# Patient Record
Sex: FEMALE | Race: WHITE | NOT HISPANIC OR LATINO | Employment: OTHER | URBAN - METROPOLITAN AREA
[De-identification: names, ages, dates, MRNs, and addresses within clinical notes are randomized per-mention and may not be internally consistent; named-entity substitution may affect disease eponyms.]

---

## 2022-09-06 ENCOUNTER — TELEPHONE (OUTPATIENT)
Dept: DERMATOLOGY | Facility: CLINIC | Age: 67
End: 2022-09-06

## 2022-09-06 NOTE — TELEPHONE ENCOUNTER
Kell has a biopsy confirmed BCC on the right medial canthus.  Biopsy completed on 8/10/22 and records are in Care Everywhere.    In person consult scheduled for 9/7 with surgery scheduled for 9/12.  Patient aware that if oculoplastics needs to complete closure that her surgery date will change.    Will complete Mohs previsit at time of consult.    Mallorie Whittaker RN

## 2022-09-07 ENCOUNTER — OFFICE VISIT (OUTPATIENT)
Dept: DERMATOLOGY | Facility: CLINIC | Age: 67
End: 2022-09-07
Payer: COMMERCIAL

## 2022-09-07 DIAGNOSIS — F41.9 ANXIETY DUE TO INVASIVE PROCEDURE: Primary | ICD-10-CM

## 2022-09-07 PROCEDURE — 99213 OFFICE O/P EST LOW 20 MIN: CPT | Mod: 57 | Performed by: DERMATOLOGY

## 2022-09-07 RX ORDER — FEXOFENADINE HCL 180 MG/1
180 TABLET ORAL DAILY
COMMUNITY

## 2022-09-07 RX ORDER — ALPRAZOLAM 0.5 MG
TABLET ORAL
Qty: 3 TABLET | Refills: 0 | Status: SHIPPED | OUTPATIENT
Start: 2022-09-07

## 2022-09-07 NOTE — NURSING NOTE
[facility] [place] Pre-Surgery Screening Note     Pre-screening Information:  Hx of Skin Cancer: No  Hx of Mohs Surgery: No  Transplant: No  Immunocompromised: No  Current Anticoagulant(s): Aspirin  Bleeding Disorder(s): No  Stent: No  Pacemaker: No  Defibrillator: No  Brain/Nerve Stimulator: No  Endocarditis/Rheumatic Fever Hx: No  Vascular graft: No  Prophylactic Antibiotic Needed: No  Congenital heart defect: No  Prosthetic Heart Valve: No  Lesion on Leg/Groin: No  Prosthetic Joint : No  Diabetic: Yes (type 2)  HIV/AIDS: No  Hepatitis: No  Pregnant: No  Prior Problem with Local Anesthesia: No  Patient wears CPAP mask: No  Current Tobacco Use: No  Current Alcohol Use: Yes  Extended Care Facility: No  Occupation: retired   needed?: Yes  Do you have mobility issues?: No  Do you use any assistive devices/DME?: No  Do you have any issues with lying for long periods of time?: No      Medications/Allergies  Medications and allergies review with patient: Yes     Current Outpatient Medications   Medication Sig Dispense Refill     ALPRAZolam (XANAX) 0.5 MG tablet Take one tablet by mouth prior to skin surgery, bring the remaining tablets to the clinic for dispensing according to clinic protocol. 3 tablet 0     fexofenadine (ALLEGRA) 180 MG tablet Take 180 mg by mouth daily       FLUTICASONE PROPIONATE 50 MCG/ACT NA SUSP PRN       HYDROCHLOROTHIAZIDE 12.5 MG PO CAPS 1 CAPSULE DAILY       LEVOTHYROXINE SODIUM 112 mcg daily       POTASSIUM BICARBONATE PO        Allergies   Allergen Reactions     Codeine        Pertinent Labs:  Last INR: No results found for: INR    Other Reminders:    Reminded patient to take any order prophylactic antibiotics 1 hour prior to appointment: N/A     Please be aware that this can be an all day procedure. Please bring your daily medications and food/cash. Encourage patient to eat prior to procedure(s). After care instructions were reviewed with  patient.    Mallorie Whittaker RN

## 2022-09-07 NOTE — LETTER
9/7/2022         RE: Kell Mcconnell  [address] [address]        Dear Colleague,    Thank you for referring your patient, Kell Mcconnell, to the [facility]. Please see a copy of my visit note below.    [facility] Dermatology Note  Encounter Date: Sep 7, 2022  Office Visit     Derm Problem List:  1. BCC, R medial canthus, s/p Mohs scheduled 9/12/22  ____________________________________________________________  Assessment & Plan:     # BCC, R medial canthus.  - The nature, risks, benefits, and alternatives to Mohs surgery were discussed. The patient would like to proceed with Mohs surgery.  - Repair options were discussed (Oculoplastic repair versus Repair in the dermatology clinic), ultimately she chose to have the repair done in the dermatology clinic.   - likely repair rhombic transposition flap vs FTSG  - anxiolytic prescribed.   - No indication for preop antibiotics.   - she does not take anticoagulants.     Follow-up: Mohs scheduled 9/12/22    Staff:     Pino Bond DO    Department of Dermatology  [facility]: Phone: [phone], Fax:[phone]  [facility]: Phone: [phone], Fax: [phone]    ____________________________________________    CC: Consult For (Consult for Mohs - BCC right medial canthus.  Biopsied at [facility].)    HPI:  Ms. Kell Mcconnell is a(n) 67 year old female who presents today as a new patient for Mohs surgery consultation for BCC on the right medial canthus. She has not had Mohs before. She has questions about wound healing and scar so close to her eyelid.     Patient is otherwise feeling well, without additional skin concerns.     Labs Reviewed:  N/A    Physical Exam:  Vitals: There were no vitals taken for this visit.  SKIN: Focused examination of right cheek and  eyelid was performed.  -  Faint atrophic pink macule, R medial canthus  - No other lesions of concern on areas examined.     Medications:  Current Outpatient Medications   Medication     ALPRAZolam (XANAX) 0.5 MG tablet     fexofenadine (ALLEGRA) 180 MG tablet     FLUTICASONE PROPIONATE 50 MCG/ACT NA SUSP     HYDROCHLOROTHIAZIDE 12.5 MG PO CAPS     LEVOTHYROXINE SODIUM     POTASSIUM BICARBONATE PO     No current facility-administered medications for this visit.      Past Medical History:   There is no problem list on file for this patient.    Past Medical History:   Diagnosis Date     Basal cell carcinoma            Again, thank you for allowing me to participate in the care of your patient.        Sincerely,        Pino Bond MD

## 2022-09-07 NOTE — PATIENT INSTRUCTIONS
"You have an upcoming appointment with Dr. Bond for Mohs surgery.  It is scheduled for 9/12/22 at 8:00 AM.  Please check-in 15 minutes prior to your appointment at check-in desk \"D\" on the 2nd floor. Our address is [address] [facility] [place].  Please review these important reminders:    1) Expect to be here through the lunch hour.  The Mohs surgical procedure can be an extensive surgery requiring multiple stages.  Each stage may take 1-2 hours.     2) You may eat breakfast. You may bring snacks or beverages with you.  3) Take all normal medications the morning of surgery.    4) You will need a  to be with you if your surgical site is close to your eyes. You can get swelling/bruising immediately after surgery and will go home with a big bulky bandage that could obstruct your view of driving safely.     5) Wear comfortable clothing -- preferably a button down shirt or a loose fitted shirt (to avoid pulling over pressure bandage off when you get home). If your surgery site is on your leg, try wearing loose fitted pants. If your surgery site is on your arm, try wearing a short-sleeve shirt.     6) Bring reading material or other items to help pass time. We do have internet access available if you own a laptop, iPad, etc.    7) If your surgery is on the face, please do not wear make-up.  8) You will not be able to get the wound wet for 48 hrs.  Please shower the morning of your procedure.  If the procedure is on your scalp or along your hair line, please wash your hair the morning of the procedure and avoid any hair products.    9) Do NOT stop blood thinning medication unless instructed to do so by your surgeon.  If you are taking warfarin or Coumadin, please have your INR blood test results sent to our office no more than 7 days prior to your surgery.     10) Tylenol and Ibuprofen is a good alternative to take for headaches and pain, and can be taken throughout your surgery and postoperative recovery.  If you " need to reschedule or have any questions or concerns, please call me at [phone].

## 2022-09-07 NOTE — NURSING NOTE
Kell Mcconnell's goals for this visit include:   Chief Complaint   Patient presents with     Consult For     Consult for Mohs - BCC right medial canthus.  Biopsied at [facility].       She requests these members of her care team be copied on today's visit information: n/a    PCP: No Ref-Primary, Physician    Referring Provider:  No referring provider defined for this encounter.    There were no vitals taken for this visit.    Do you need any medication refills at today's visit? No  Mallorie Whittaker RN

## 2022-09-12 ENCOUNTER — OFFICE VISIT (OUTPATIENT)
Dept: DERMATOLOGY | Facility: CLINIC | Age: 67
End: 2022-09-12
Payer: COMMERCIAL

## 2022-09-12 DIAGNOSIS — C44.111 BASAL CELL CARCINOMA (BCC) OF MEDIAL CANTHUS OF RIGHT EYE: Primary | ICD-10-CM

## 2022-09-12 PROCEDURE — 17311 MOHS 1 STAGE H/N/HF/G: CPT | Performed by: DERMATOLOGY

## 2022-09-12 PROCEDURE — 14060 TIS TRNFR E/N/E/L 10 SQ CM/<: CPT | Performed by: DERMATOLOGY

## 2022-09-12 NOTE — PATIENT INSTRUCTIONS
Mohs Wound Care Instructions  I will experience scar, altered skin color, bleeding, swelling, pain, crusting and redness. I may experience altered sensation. Risks are excessive bleeding, infection, muscle weakness, thick (hypertrophic or keloidal) scar, and recurrence. A second procedure may be recommended to obtain the best cosmetic or functional result.  Possible complications of any surgical procedure are bleeding, infection, scarring, alteration in skin color and sensation, muscle weakness in the area, wound dehiscence or seperation, or recurrence of the lesion or disease. On occasion, after healing, a secondary procedure or revision may be recommended in order to obtain the best cosmetic or functional result.   After your surgery, a pressure bandage will be placed over the area that has sutures. This will help prevent bleeding. Please follow these instructions as they will help you to prevent complications as your wound heals.  For the First 48 hours After Surgery:  Leave the pressure bandage on and keep it dry. If it should come loose, you may retape it, but do not take it off.  Relax and take it easy. Do not do any vigorous exercise, heavy lifting, or bending forward. This could cause the wound to bleed.  Post-operative pain is usually mild. You may alternate between 1000 mg of Tylenol (acetaminophen) and 400 mg of Ibuprofen every 4 hours.  Do not take more than 4,000mg of acetaminophen in a 24 hour period or 3200 mg of Ibuprofen in a 24 hr period.  Avoid alcohol and vitamin E as these may increase your tendency to bleed.  You may put an ice pack around the bandaged area for 20 minutes every 2-3 hours. This may help reduce swelling, bruising, and pain. Make sure the ice pack is waterproof so that the pressure bandage does not get wet.   You may see a small amount of drainage or blood on your pressure bandage. This is normal. However, if drainage or bleeding continues or saturates the bandage, you will need  to apply firm pressure over the bandage with a washcloth for 15 minutes. If bleeding continues after applying pressure for 15 minutes then go to the nearest emergency room.  48 Hours After Surgery  Carefully remove the bandage and start daily wound care and dressing changes. You may also now shower and get the wound wet.  Daily Wound Care:  Wash wound with a mild soap and water.  Use caution when washing the wound, be gentle and do not let the forceful shower stream hit the wound directly.  Pat dry.  Apply Vaseline (from a new container or tube) over the suture line with a Q-tip. It is very important to keep the wound continuously moist, as wounds heal best in a moist environment.  Keep the site covered until sutures dissolved.  You can cover it with a Telfa (non-stick) dressing and tape or a band-aid.    If you are unable to keep wound covered, you must apply Vaseline every 2-3 hours (while awake) to ensure it is being kept moist for optimal healing. A dressing overnight is recommended to keep the area moist.  Call Us If:  You have pain that is not controlled with Tylenol/Ibuprofen  You have signs or symptoms of an infection, such as: fever over 100 degrees F, redness, warmth, or foul-smelling or yellow drainage from the wound.  Who should I call with questions?  [facility]: [phone]   [facility]: [phone]  For urgent needs outside of business hours call the [facility] at [phone] and ask to speak with the dermatology resident on call

## 2022-09-12 NOTE — NURSING NOTE
Kell Mcconnell's goals for this visit include:   Chief Complaint   Patient presents with     Procedure     Mohs - BCC right medial canthus       She requests these members of her care team be copied on today's visit information: n/a    PCP: No Ref-Primary, Physician    Referring Provider:  No referring provider defined for this encounter.    There were no vitals taken for this visit.    Do you need any medication refills at today's visit? No  Mallorie Whittaker RN

## 2022-09-12 NOTE — LETTER
9/12/2022         RE: Kell Mcconnell  [address] [address]        Dear Colleague,    Thank you for referring your patient, Kell Mcconnell, to the [facility]. Please see a copy of my visit note below.    [facility] [place] Procedure Note    Derm Problem List:  1. BCC, R medial canthus, s/p Mohs and rhombic transposition flap repair 9/12/22  _________________________________________________________________    Date of Service:  Sep 12, 2022  Surgery: Mohs micrographic surgery    Case 1  Repair Type: other (comment) (Rhombic transposition flap)  Repair Size: 1.6x2.5 cm  Suture Material: Fast Absorbing Gut 5-0  Tumor Type: BCC - Basal cell carcinoma  Location: right medial canthus  Derm-Path Accession #: [id number]  PreOp Size: 0.5x0.5 cm  PostOp Size: 1.0x0.9 cm  Mohs Accession #: [id number]  Level of Defect: fascia      Procedure:  We discussed the principles of treatment and most likely complications including scarring, bleeding, infection, swelling, pain, crusting, nerve damage, large wound,  incomplete excision, wound dehiscence,  nerve damage, recurrence, and a second procedure may be recommended to obtain the best cosmetic or functional result.    Informed consent was obtained and the patient underwent the procedure as follows:  The patient was placed supine on the operating table.  The cancer was identified, outlined with a marker, and verified by the patient.  The entire surgical field was prepped with Providone.  The surgical site was anesthetized using Lidocaine 1% with epi 1:100,000.      The area of clinically apparent tumor was debulked. The layer of tissue was then surgically excised using a #15 blade and was then transferred onto a specimen sheet maintaining the orientation of the specimen. Hemostasis was obtained using bipolar electrocoagulation. The wound site was then covered with a dressing while the  tissue samples were processed for examination.    The excised tissue was transported to the Mohs histology laboratory maintaining the tissue orientation.  The tissue specimen was relaxed so that the entire surgical margin was in a a single horizontal plane for sectioning and inked for precise mapping.  A precise reference map was drawn to reflect the sectioning of the specimen, colored inking of the margins, and orientation on the patient. The tissue was processed using horizontal sectioning of the base and continuous peripheral margins.  The histopathologic sections were reviewed in conjunction with the reference map.    Total blocks: 1    Total slides:  1    There were no cancer cells visualized on examination, therefore Mohs surgery was complete.    PROCEDURE: Rhombic Transposition Flap    The patient was taken to the operative suite and placed supine on the operating room table.  The wound was identified and infiltrated with 1% lidocaine with epinephrine.  The defect was then cleansed and prepped with povidone iodine and draped with sterile drapes.  Using a marker, a rhomboid transposition flap repair was planned.  The wound edges were then debeveled and the wound was undermined bluntly in all directions.  The transposition flap was incised sharply to the level of fat.  The flap was undermined from all surrounding tissue. Hemostasis was obtained with bipolar electrocoagulation.  The flap was transposed into the primary defect.  The secondary defect and flap closed with deep 5-0 monocryl sutures. Epidermal tissue was carefully approximated using simple running 5-0 fast absorbing gut epidermal sutures throughout the length of the flap.  Redundant skin was excised by the triangulation technique, and closed in similar fashion.  The wound was cleansed with sterile saline and polysporin was applied. A sterile non-adherent pressure dressing was placed.  The patient left the operating suite in stable condition.  The  patient will return in seven to ten days for suture removal. Wound care was reviewed verbally and in writing. Anticipate Dermabrasion to be used as a second stage of this reconstruction.     Follow up in 2 weeks    Scribe Disclosure:   I, Keith Banda, am serving as a scribe to document services personally performed by this physician, Dr. Pino Bond, based on data collection and the provider's statements to me.     Provider Disclosure:   The documentation recorded by the scribe accurately reflects the services I personally performed and the decisions made by me.  I personally performed the procedures today.    Pino Bond DO    Department of Dermatology  [facility]: Phone: [phone], Fax:[phone]  [facility] [place]: Phone: [phone], Fax: [phone]    Care and Laboratory Testing Performed at:  [facility]   Dermatology Clinic  [address] [place] [address]        Again, thank you for allowing me to participate in the care of your patient.        Sincerely,        Pino Bond MD

## 2022-09-12 NOTE — PROGRESS NOTES
[facility] [place] Procedure Note    Derm Problem List:  1. BCC, R medial canthus, s/p Mohs and rhombic transposition flap repair 9/12/22  _________________________________________________________________    Date of Service:  Sep 12, 2022  Surgery: Mohs micrographic surgery    Case 1  Repair Type: other (comment) (Rhombic transposition flap)  Repair Size: 1.6x2.5 cm  Suture Material: Fast Absorbing Gut 5-0  Tumor Type: BCC - Basal cell carcinoma  Location: right medial canthus  Derm-Path Accession #: [id number]  PreOp Size: 0.5x0.5 cm  PostOp Size: 1.0x0.9 cm  Mohs Accession #: [id number]  Level of Defect: fascia      Procedure:  We discussed the principles of treatment and most likely complications including scarring, bleeding, infection, swelling, pain, crusting, nerve damage, large wound,  incomplete excision, wound dehiscence,  nerve damage, recurrence, and a second procedure may be recommended to obtain the best cosmetic or functional result.    Informed consent was obtained and the patient underwent the procedure as follows:  The patient was placed supine on the operating table.  The cancer was identified, outlined with a marker, and verified by the patient.  The entire surgical field was prepped with Providone.  The surgical site was anesthetized using Lidocaine 1% with epi 1:100,000.      The area of clinically apparent tumor was debulked. The layer of tissue was then surgically excised using a #15 blade and was then transferred onto a specimen sheet maintaining the orientation of the specimen. Hemostasis was obtained using bipolar electrocoagulation. The wound site was then covered with a dressing while the tissue samples were processed for examination.    The excised tissue was transported to the Mohs histology laboratory maintaining the tissue orientation.  The tissue specimen was relaxed so that the entire surgical margin was in a a single horizontal plane for  sectioning and inked for precise mapping.  A precise reference map was drawn to reflect the sectioning of the specimen, colored inking of the margins, and orientation on the patient. The tissue was processed using horizontal sectioning of the base and continuous peripheral margins.  The histopathologic sections were reviewed in conjunction with the reference map.    Total blocks: 1    Total slides:  1    There were no cancer cells visualized on examination, therefore Mohs surgery was complete.    PROCEDURE: Rhombic Transposition Flap    The patient was taken to the operative suite and placed supine on the operating room table.  The wound was identified and infiltrated with 1% lidocaine with epinephrine.  The defect was then cleansed and prepped with povidone iodine and draped with sterile drapes.  Using a marker, a rhomboid transposition flap repair was planned.  The wound edges were then debeveled and the wound was undermined bluntly in all directions.  The transposition flap was incised sharply to the level of fat.  The flap was undermined from all surrounding tissue. Hemostasis was obtained with bipolar electrocoagulation.  The flap was transposed into the primary defect.  The secondary defect and flap closed with deep 5-0 monocryl sutures. Epidermal tissue was carefully approximated using simple running 5-0 fast absorbing gut epidermal sutures throughout the length of the flap.  Redundant skin was excised by the triangulation technique, and closed in similar fashion.  The wound was cleansed with sterile saline and polysporin was applied. A sterile non-adherent pressure dressing was placed.  The patient left the operating suite in stable condition.  The patient will return in seven to ten days for suture removal. Wound care was reviewed verbally and in writing. Anticipate Dermabrasion to be used as a second stage of this reconstruction.     Follow up in 2 weeks    Scribe Disclosure:   Keith LEONARDO am serving  as a scribe to document services personally performed by this physician, Dr. Pino Bond, based on data collection and the provider's statements to me.     Provider Disclosure:   The documentation recorded by the scribe accurately reflects the services I personally performed and the decisions made by me.  I personally performed the procedures today.    Pino Bond DO    Department of Dermatology  [facility]: Phone: [phone], Fax:[phone]  [facility]: Phone: [phone], Fax: [phone]    Care and Laboratory Testing Performed at:  [facility]   Dermatology Clinic  [address] [place] [address]

## 2022-09-12 NOTE — NURSING NOTE
The following medication was given:     MEDICATION:  Lidocaine with epinephrine 1% 1:799011  ROUTE: SQ  SITE: see procedure note  DOSE: 4.0mL  LOT #: -EV  : Hospira  EXPIRATION DATE: 1/1/23  NDC#: 9155-8972-05  Was there drug waste? 2mL  Multi-dose vial: Yes    Vaseline and pressure dressing applied to Mohs site on right medial canthus.  Wound care instructions reviewed with patient and AVS provided.  Patient verbalized understanding.  Patient will follow up for wound check in 2 weeks.  No further questions or concerns at this time.    Mallorie Whittaker RN

## 2022-09-13 ENCOUNTER — TELEPHONE (OUTPATIENT)
Dept: DERMATOLOGY | Facility: CLINIC | Age: 67
End: 2022-09-13

## 2022-09-13 NOTE — TELEPHONE ENCOUNTER
SUBJECTIVE/OBJECTIVE:                                                    Kell is 1 day s/p Mohs for BCC.     ASSESSMENT:                                                       NURSING ASSESSMENT:     Wound location: right medial canthus       What are you doing to manage your pain?  Alternating between Tylenol and Ibuprofen    Is it helping?  Yes    Are you applying ice? Yes    Have you had any noticeable bleeding through the bandage?  No, dressing is dry and intact.    Do you have any concerns?  No     PLAN:                                                       Wound care directions reviewed.  Post op appointment confirmed.   Mallorie Whittaker RN

## 2022-09-26 ENCOUNTER — OFFICE VISIT (OUTPATIENT)
Dept: DERMATOLOGY | Facility: CLINIC | Age: 67
End: 2022-09-26
Payer: COMMERCIAL

## 2022-09-26 DIAGNOSIS — Z48.89 ENCOUNTER FOR POSTOPERATIVE WOUND CHECK: ICD-10-CM

## 2022-09-26 DIAGNOSIS — C44.111 BASAL CELL CARCINOMA (BCC) OF MEDIAL CANTHUS OF RIGHT EYE: Primary | ICD-10-CM

## 2022-09-26 PROCEDURE — 99024 POSTOP FOLLOW-UP VISIT: CPT | Mod: GC | Performed by: DERMATOLOGY

## 2022-09-26 ASSESSMENT — PAIN SCALES - GENERAL: PAINLEVEL: NO PAIN (0)

## 2022-09-26 NOTE — LETTER
9/26/2022         RE: Kell Mcconnell  [address] [address]        Dear Colleague,    Thank you for referring your patient, Kell Mcconnell, to the [facility]. Please see a copy of my visit note below.    No notes on file    Again, thank you for allowing me to participate in the care of your patient.        Sincerely,        Pino Bond MD

## 2022-09-26 NOTE — PROGRESS NOTES
Dermatologic Surgery Clinic Note    Sep 26, 2022    Dermatology Problem List:  1. BCC, R medial canthus, s/p Mohs and rhombic transposition flap repair 9/12/22    Subjective: The patient is a 67 year old woman who presents today for a wound check after recent dermatologic surgery. No concerns for infection today. The patient continues with daily wound cares as recommended.    Patient reports uncomplicated healing. Notes wound is still pink and bumpy. She has continued applying Vaseline and covering wound at night. Patient has chronic dry eyes but notes this is at baseline since surgery. Denies irritation or excessive tearing    No other associated symptoms, modifying factors, or prior treatments, except when noted above. The patient denies any constitutional symptoms, lymphadenopathy, unintentional weight loss or decreased appetite. No other skin concerns today.    Objective:   Gen: This is a well appearing individual in no acute distress. The patient is alert and oriented x 3.  An exam of the face was performed today and visualized the following:  - Healing incisions on R medial canthus/nasal sidewall with mild surrounding erythema  - The surgical site noted above is clean, dry, and intact. There is no surrounding erythema, purulence, or significant tenderness to palpation. No clinical evidence of infection noted today.    Assessment and Plan:   # BCC, R medial canthus, s/p Mohs and rhombic transposition flap repair 9/12/22  - The patient's surgery site(s) is/are healing very well. No evidence of infection on examination today.  - Discussed raised portions of incision and erythema will improve with time  - No longer need to cover wound with bandage. Can cont daily Vaseline or silicone gel to incision daily to improve cosmesis  - The patient should follow up with dermatologic surgery for 3 month scar eval, as well as continue with regular skin exams in general dermatology clinic.    The patient was discussed with  and evaluated by attending physician, Pino Bond DO.    Emerson Gil MD  Dermatology, PGY-5  Mohs surgery fellow    Staff Physician Comments:   I saw and evaluated the patient with the Mohs Surgery Fellow (Dr. Emerosn Gil) and I agree with the assessment and plan. I was present for the entire exam.    Pnio Bond DO    Department of Dermatology  [facility]: Phone: [phone], Fax:[phone]  [facility]: Phone: [phone], Fax: [phone]

## 2022-09-26 NOTE — NURSING NOTE
Kell Mcconnell's goals for this visit include:   Chief Complaint   Patient presents with     Wound Check     Right medial canthus       She requests these members of her care team be copied on today's visit information:     PCP: No Ref-Primary, Physician    Referring Provider:  No referring provider defined for this encounter.    There were no vitals taken for this visit.    Do you need any medication refills at today's visit? Lauren Ledesma LPN

## 2022-10-04 NOTE — PROGRESS NOTES
----- Message from Caridad Calhoun sent at 8/3/2018  4:30 PM CDT -----  Contact: patient  Returning your call. Please call patient @ [phone]. Thanks, palak   [facility] Dermatology Note  Encounter Date: Sep 7, 2022  Office Visit     Derm Problem List:  1. BCC, R medial canthus, s/p Mohs scheduled 9/12/22  ____________________________________________________________  Assessment & Plan:     # BCC, R medial canthus.  - The nature, risks, benefits, and alternatives to Mohs surgery were discussed. The patient would like to proceed with Mohs surgery.  - Repair options were discussed (Oculoplastic repair versus Repair in the dermatology clinic), ultimately she chose to have the repair done in the dermatology clinic.   - likely repair rhombic transposition flap vs FTSG  - anxiolytic prescribed.   - No indication for preop antibiotics.   - she does not take anticoagulants.     Follow-up: Mohs scheduled 9/12/22    Staff:     Pino Bond DO    Department of Dermatology  [facility]: Phone: [phone], Fax:[phone]  [facility]: Phone: [phone], Fax: [phone]    ____________________________________________    CC: Consult For (Consult for Mohs - BCC right medial canthus.  Biopsied at [facility].)    HPI:  Ms. Kell Mcconnell is a(n) 67 year old female who presents today as a new patient for Mohs surgery consultation for BCC on the right medial canthus. She has not had Mohs before. She has questions about wound healing and scar so close to her eyelid.     Patient is otherwise feeling well, without additional skin concerns.     Labs Reviewed:  N/A    Physical Exam:  Vitals: There were no vitals taken for this visit.  SKIN: Focused examination of right cheek and eyelid was performed.  -  Faint atrophic pink macule, R medial canthus  - No other lesions of concern on areas examined.     Medications:  Current Outpatient Medications   Medication     ALPRAZolam (XANAX) 0.5 MG tablet     fexofenadine (ALLEGRA) 180 MG tablet      FLUTICASONE PROPIONATE 50 MCG/ACT NA SUSP     HYDROCHLOROTHIAZIDE 12.5 MG PO CAPS     LEVOTHYROXINE SODIUM     POTASSIUM BICARBONATE PO     No current facility-administered medications for this visit.      Past Medical History:   There is no problem list on file for this patient.    Past Medical History:   Diagnosis Date     Basal cell carcinoma

## 2023-02-02 ENCOUNTER — OFFICE VISIT (OUTPATIENT)
Dept: DERMATOLOGY | Facility: CLINIC | Age: 68
End: 2023-02-02
Payer: COMMERCIAL

## 2023-02-02 DIAGNOSIS — L90.5 FACIAL SCAR: ICD-10-CM

## 2023-02-02 DIAGNOSIS — Z85.828 HISTORY OF BASAL CELL CARCINOMA OF SKIN: Primary | ICD-10-CM

## 2023-02-02 PROCEDURE — 99024 POSTOP FOLLOW-UP VISIT: CPT | Performed by: DERMATOLOGY

## 2023-02-02 NOTE — NURSING NOTE
Kell Mcconnell's goals for this visit include:   Chief Complaint   Patient presents with     Scar Management     3 month scar evaluation       She requests these members of her care team be copied on today's visit information: n/a    PCP: No Ref-Primary, Physician    Referring Provider:  Pino Bond MD  [address] [place] [address]    There were no vitals taken for this visit.    Do you need any medication refills at today's visit? No  Mallorie Whittaker RN

## 2023-02-02 NOTE — LETTER
2/2/2023         RE: Kell Mcconnell  [address] [address]        Dear Colleague,    Thank you for referring your patient, Kell Mcconnell, to the [facility]. Please see a copy of my visit note below.    [facility] Dermatology Note  Encounter Date: Feb 2, 2023  Office Visit     Dermatology Problem List:  Last skin exam: 8/10/22  #. History of non-melanoma skin cancer:  - nodular BCC, right medial canthus, s/p MMS 9/26/22 (Dr. Bond at [facility])  #. Periorificial dermatitis  - resolved w/oral doxycycline + topical metronidazole, 8/2022  ____________________________    Assessment & Plan:     # Nodular BCC, right medial canthus, s/p MMS 9/26/22   - Rhombic Flap healing well, mild hypertrophy on one edge  - Reviewed scar massage instructions.   - Follow up skin cancer screening in April (unable to get into her referring dermatologist until Fall).   - Plan to return to her referring dermatologist at that time.     - If scar hypertrophy not improving with scar massage or worsening, will consider intralesional triamcinolone treatment.   - continue sun avoidance and sun protection.     Staff:     Pino Bond DO    Department of Dermatology  [facility]: Phone: [phone], Fax:[phone]  [facility]: Phone: [phone], Fax: [phone]    ____________________________________________    CC: Scar Management (3 month scar evaluation)    HPI:  Ms. Kell Mcconnell is a(n) 67 year old female who presents today for follow-up  after Mohs for Nod BCC of the right medial canthus. 3 months s/p Mohs and rhombic flap repair. One edge of the flap is still firm. She denies any significant pain.      Patient is otherwise feeling well, without additional skin concerns.     Labs Reviewed:  N/A    Physical Exam:  Vitals: There were  no vitals taken for this visit.  SKIN: Focused examination of face was performed.  - R medial canthus, geometric scar consistent with rhombic transposition flap.    - Horizontal scar with mild hypertrophy      - No other lesions of concern on areas examined.     Medications:  Current Outpatient Medications   Medication     ALPRAZolam (XANAX) 0.5 MG tablet     fexofenadine (ALLEGRA) 180 MG tablet     FLUTICASONE PROPIONATE 50 MCG/ACT NA SUSP     HYDROCHLOROTHIAZIDE 12.5 MG PO CAPS     LEVOTHYROXINE SODIUM     POTASSIUM BICARBONATE PO     No current facility-administered medications for this visit.      Past Medical History:   There is no problem list on file for this patient.    Past Medical History:   Diagnosis Date     Basal cell carcinoma            Again, thank you for allowing me to participate in the care of your patient.        Sincerely,        Pino Bond MD

## 2023-02-02 NOTE — PROGRESS NOTES
[facility] Dermatology Note  Encounter Date: Feb 2, 2023  Office Visit     Dermatology Problem List:  Last skin exam: 8/10/22  #. History of non-melanoma skin cancer:  - nodular BCC, right medial canthus, s/p [facility] 9/26/22 (Dr. Bond at [facility])  #. Periorificial dermatitis  - resolved w/oral doxycycline + topical metronidazole, 8/2022  ____________________________    Assessment & Plan:     # Nodular BCC, right medial canthus, s/p MMS 9/26/22   - Rhombic Flap healing well, mild hypertrophy on one edge  - Reviewed scar massage instructions.   - Follow up skin cancer screening in April (unable to get into her referring dermatologist until Fall).   - Plan to return to her referring dermatologist at that time.     - If scar hypertrophy not improving with scar massage or worsening, will consider intralesional triamcinolone treatment.   - continue sun avoidance and sun protection.     Staff:     Pino Bond DO    Department of Dermatology  [facility]: Phone: [phone], Fax:[phone]  [facility]: Phone: [phone], Fax: [phone]    ____________________________________________    CC: Scar Management (3 month scar evaluation)    HPI:  Ms. Kell Mcconnell is a(n) 67 year old female who presents today for follow-up  after Mohs for Nod BCC of the right medial canthus. 3 months s/p Mohs and rhombic flap repair. One edge of the flap is still firm. She denies any significant pain.      Patient is otherwise feeling well, without additional skin concerns.     Labs Reviewed:  N/A    Physical Exam:  Vitals: There were no vitals taken for this visit.  SKIN: Focused examination of face was performed.  - R medial canthus, geometric scar consistent with rhombic transposition flap.    - Horizontal scar with mild hypertrophy      - No other lesions of concern on areas examined.      Medications:  Current Outpatient Medications   Medication     ALPRAZolam (XANAX) 0.5 MG tablet     fexofenadine (ALLEGRA) 180 MG tablet     FLUTICASONE PROPIONATE 50 MCG/ACT NA SUSP     HYDROCHLOROTHIAZIDE 12.5 MG PO CAPS     LEVOTHYROXINE SODIUM     POTASSIUM BICARBONATE PO     No current facility-administered medications for this visit.      Past Medical History:   There is no problem list on file for this patient.    Past Medical History:   Diagnosis Date     Basal cell carcinoma

## 2023-02-02 NOTE — PROGRESS NOTES
"Dermatologic Surgery Clinic Note    Feb 2, 2023    Dermatology Problem List:  ***    Subjective: The patient is a 67 year old {wmgender:[id number]} who presents today for a wound check after recent dermatologic surgery. No concerns for infection today. The patient continues with daily wound cares as recommended.    No other associated symptoms, modifying factors, or prior treatments, except when noted above. The patient denies any constitutional symptoms, lymphadenopathy, unintentional weight loss or decreased appetite. No other skin concerns today.    Objective:   Gen: This is a well appearing individual in no acute distress. The patient is alert and oriented x 3.  An exam of the *** was performed today and visualized the following:    - The surgical site noted above is clean, dry, and intact. There is no surrounding erythema, purulence, or significant tenderness to palpation. No clinical evidence of infection noted today.    Assessment and Plan:     - The patient's surgery site(s) is/are healing very well. No evidence of infection on examination today.  - The patient was told to continue with wound cares until the area(s) is/are no longer crusted.   - The patient should follow up with dermatologic surgery PRN, as well as continue with regular skin exams in general dermatology clinic.    The patient was discussed with and evaluated by attending physician, {surYavapai Regional Medical Centeryfaculty:[id number]::\"Crow Maldonado MD\"}.    Emerson Gil MD  Dermatology, PGY-5  Mohs surgery fellow  "

## 2023-04-27 ENCOUNTER — OFFICE VISIT (OUTPATIENT)
Dept: DERMATOLOGY | Facility: CLINIC | Age: 68
End: 2023-04-27
Payer: COMMERCIAL

## 2023-04-27 ENCOUNTER — TELEPHONE (OUTPATIENT)
Dept: DERMATOLOGY | Facility: CLINIC | Age: 68
End: 2023-04-27

## 2023-04-27 DIAGNOSIS — R23.8 FACIAL AGING: Primary | ICD-10-CM

## 2023-04-27 DIAGNOSIS — Z85.828 HISTORY OF BASAL CELL CARCINOMA OF SKIN: ICD-10-CM

## 2023-04-27 DIAGNOSIS — L90.5 FACIAL SCAR: ICD-10-CM

## 2023-04-27 PROCEDURE — 99024 POSTOP FOLLOW-UP VISIT: CPT | Performed by: DERMATOLOGY

## 2023-04-27 RX ORDER — TRETINOIN 0.25 MG/G
CREAM TOPICAL
Qty: 20 G | Refills: 3 | Status: SHIPPED | OUTPATIENT
Start: 2023-04-27

## 2023-04-27 ASSESSMENT — PAIN SCALES - GENERAL: PAINLEVEL: NO PAIN (0)

## 2023-04-27 NOTE — LETTER
4/27/2023         RE: Kell Mcconnell  [address] [address]        Dear Colleague,    Thank you for referring your patient, Kell Mcconnell, to the [facility]. Please see a copy of my visit note below.    [facility] Dermatology Note  Encounter Date: Apr 27, 2023  Office Visit     Dermatology Problem List:  Last skin exam: 8/10/22  #. History of non-melanoma skin cancer:  - nodular BCC, right medial canthus, s/p [facility] 9/26/22 (Dr. Bond at [facility])  #. Periorificial dermatitis  - resolved w/oral doxycycline + topical metronidazole, 8/2022  ____________________________    Assessment & Plan:     # Nodular BCC, right medial canthus, s/p [facility] 9/26/22   - Rhombic Flap healing well, mild hypertrophy on superior edge  - Discussed Intralesional Kenalog treatment vs dermabrasion. I recommend starting with intralesional Kenalog, once texture acceptable decide if dermabrasion could help.     - patient hesitant to have treatment done today. She will think about it and call to schedule.   - continue sun avoidance and sun protection.     # Facial aging  Patient with age related volume loss and etched in wrinkles.   Briefly discussed volume replacement, botulinum toxins, blepharoplasty.   Could consider energy based treatment such as ultrasound, radiofrequency+microneedling, or CO2RE laser.     Patient requests prescription for Tretinoin she used to have a home supply, but it's older an she isn't sure its good anymore. Prescription Printed, could also consider OTC Differin if she is dabbling, and not sure she wants to spend the money on Tretinoin. Instructions briefly reviewed.       Staff:     Pino Bond DO    Department of Dermatology  [facility]: Phone: [phone], Fax:[phone]  [facility]: Phone: [phone], Fax:  [phone]    ____________________________________________    CC: Scar Management (/Scar eval right medial canthus Mohs 9/2022)    HPI:  Ms. Kell Mcconnell is a(n) 68 year old female who presents today for follow-up  after Mohs for Nod BCC of the right medial canthus. s/p Mohs and rhombic flap repair. The superior edge of the flap is still firm. She denies any significant pain. The inferior edge remains a little pink compared to the rest.        Patient is otherwise feeling well, without additional skin concerns.     Labs Reviewed:  N/A    Physical Exam:  Vitals: There were no vitals taken for this visit.  SKIN: Focused examination of face was performed.  - R medial canthus, geometric scar consistent with rhombic transposition flap.    - Horizontal scar with mild hypertrophy      - No other lesions of concern on areas examined.     Medications:  Current Outpatient Medications   Medication     fexofenadine (ALLEGRA) 180 MG tablet     HYDROCHLOROTHIAZIDE 12.5 MG PO CAPS     LEVOTHYROXINE SODIUM     POTASSIUM BICARBONATE PO     tretinoin (RETIN-A) 0.025 % external cream     ALPRAZolam (XANAX) 0.5 MG tablet     FLUTICASONE PROPIONATE 50 MCG/ACT NA SUSP     No current facility-administered medications for this visit.      Past Medical History:   There is no problem list on file for this patient.    Past Medical History:   Diagnosis Date     Basal cell carcinoma        Again, thank you for allowing me to participate in the care of your patient.        Sincerely,        Pino Bond MD     none

## 2023-04-27 NOTE — TELEPHONE ENCOUNTER
PRIOR AUTHORIZATION DENIED    Medication: tretinoin (RETIN-A) 0.025 % external cream - EPA DENIED    Denial Date: 4/27/2023    Denial Rational:       Appeal Information:

## 2023-04-27 NOTE — PATIENT INSTRUCTIONS
Start tretinoin 0.025% cream - apply once every other day and increase to nightly as tolerate.  Waiting 20-30 minutes after washing affected areas will decrease irritation. Method of application, side effects and expected results were discussed. The patient will apply pea size amount to the entire face, avoid areas around the eyes, corners of nose and mouth. Discussed side effects including photosensitivity and irritation. Discussed medication is pregnancy category C and patient should stop medication and contact clinic if she becomes pregnant.      Over the Counter adapelene (Differin) 0.1% gel can be an over the counter less expensive alternative to tretinoin.       PHOTO: [email or web address]

## 2023-04-27 NOTE — PROGRESS NOTES
[facility] Dermatology Note  Encounter Date: Apr 27, 2023  Office Visit     Dermatology Problem List:  Last skin exam: 8/10/22  #. History of non-melanoma skin cancer:  - nodular BCC, right medial canthus, s/p [facility] 9/26/22 (Dr. Bond at [facility])  #. Periorificial dermatitis  - resolved w/oral doxycycline + topical metronidazole, 8/2022  ____________________________    Assessment & Plan:     # Nodular BCC, right medial canthus, s/p MMS 9/26/22   - Rhombic Flap healing well, mild hypertrophy on superior edge  - Discussed Intralesional Kenalog treatment vs dermabrasion. I recommend starting with intralesional Kenalog, once texture acceptable decide if dermabrasion could help.     - patient hesitant to have treatment done today. She will think about it and call to schedule.   - continue sun avoidance and sun protection.     # Facial aging  Patient with age related volume loss and etched in wrinkles.   Briefly discussed volume replacement, botulinum toxins, blepharoplasty.   Could consider energy based treatment such as ultrasound, radiofrequency+microneedling, or CO2RE laser.     Patient requests prescription for Tretinoin she used to have a home supply, but it's older an she isn't sure its good anymore. Prescription Printed, could also consider OTC Differin if she is dabbling, and not sure she wants to spend the money on Tretinoin. Instructions briefly reviewed.       Staff:     Pino Bond DO    Department of Dermatology  [facility]: Phone: [phone], Fax:[phone]  [facility]: Phone: [phone], Fax: [phone]    ____________________________________________    CC: Scar Management (/Scar eval right medial canthus Mohs 9/2022)    HPI:  Ms. Kell Mcconnell is a(n) 68 year old female who presents today for follow-up  after Mohs for Nod BCC of the right medial canthus.  s/p Mohs and rhombic flap repair. The superior edge of the flap is still firm. She denies any significant pain. The inferior edge remains a little pink compared to the rest.        Patient is otherwise feeling well, without additional skin concerns.     Labs Reviewed:  N/A    Physical Exam:  Vitals: There were no vitals taken for this visit.  SKIN: Focused examination of face was performed.  - R medial canthus, geometric scar consistent with rhombic transposition flap.    - Horizontal scar with mild hypertrophy      - No other lesions of concern on areas examined.     Medications:  Current Outpatient Medications   Medication     fexofenadine (ALLEGRA) 180 MG tablet     HYDROCHLOROTHIAZIDE 12.5 MG PO CAPS     LEVOTHYROXINE SODIUM     POTASSIUM BICARBONATE PO     tretinoin (RETIN-A) 0.025 % external cream     ALPRAZolam (XANAX) 0.5 MG tablet     FLUTICASONE PROPIONATE 50 MCG/ACT NA SUSP     No current facility-administered medications for this visit.      Past Medical History:   There is no problem list on file for this patient.    Past Medical History:   Diagnosis Date     Basal cell carcinoma

## 2023-04-27 NOTE — NURSING NOTE
Kell Mcconnell's chief complaint for this visit includes:  Chief Complaint   Patient presents with     Scar Management     /Scar eval right medial canthus Mohs 9/2022     PCP: No Ref-Primary, Physician    Referring Provider:  No referring provider defined for this encounter.    There were no vitals taken for this visit.  No Pain (0)        Allergies   Allergen Reactions     Morphine And Related          Do you need any medication refills at today's visit? No    Debbie Mirza, CMA

## 2023-04-28 NOTE — TELEPHONE ENCOUNTER
Pt called and notified of message below and PA denial.    Hanna Craft RN on 4/28/2023 at 4:44 PM